# Patient Record
Sex: FEMALE | Race: WHITE
[De-identification: names, ages, dates, MRNs, and addresses within clinical notes are randomized per-mention and may not be internally consistent; named-entity substitution may affect disease eponyms.]

---

## 2017-04-26 ENCOUNTER — HOSPITAL ENCOUNTER (EMERGENCY)
Dept: HOSPITAL 10 - E/R | Age: 42
Discharge: HOME | End: 2017-04-26
Payer: MEDICAID

## 2017-04-26 VITALS
HEIGHT: 64 IN | WEIGHT: 221.56 LBS | HEIGHT: 64 IN | WEIGHT: 221.56 LBS | BODY MASS INDEX: 37.83 KG/M2 | BODY MASS INDEX: 37.83 KG/M2

## 2017-04-26 DIAGNOSIS — Z79.82: ICD-10-CM

## 2017-04-26 DIAGNOSIS — J20.9: Primary | ICD-10-CM

## 2017-04-26 PROCEDURE — 99283 EMERGENCY DEPT VISIT LOW MDM: CPT

## 2017-04-26 NOTE — ERD
ER Documentation


Chief Complaint


Date/Time


DATE: 4/26/17 


TIME: 19:41


Chief Complaint


cough x 4 days, chest congestion





HPI


42-year-old female presents to emergency department for complaints of cough, 

runny nose, nasal congestion, on and off wheezing for 4 days. Patient has been 

having dry cough, does not cough up any phlegm or blood. Patient does not have 

any sore throat or ear pain. Patient denies any chest pain or palpitations. 

Patient did not take any medications to symptoms. Patient denies any fever or 

chills.





ROS


All systems reviewed and are negative except as per history of present illness.





Medications


Home Meds


Active Scripts


Albuterol Sulfate* (Proair HFA*) 8.5 Gm Hfa.aer.ad, 2 PUFF INH Q4H Y for 

WHEEZING AND SOB, #1 INHALER


   Prov:CAMELIA WAHL NP         4/26/17


Fluticasone Propionate (Flonase Allergy Relief) 9.9 Ml [place].susp, 1 SPRAY 

NASAL BID, #1 BOTTLE


   TO EACH NOSTRIL


   Prov:CAMELIA WAHL NP         4/26/17


Ibuprofen* (Motrin*) 400 Mg Tab, 400 MG PO Q6H Y for PAIN AND OR ELEVATED TEMP, 

#30 TAB


   Prov:CAMELIA WAHL NP         4/26/17


Cetirizine Hcl* (Zyrtec*) 10 Mg Capsule, 10 MG PO DAILY, #30 TAB.CHEW


   Prov:CAMELIA WAHL NP         4/26/17


Guaifenesin-D-Methorphan Hb* (Guaifenesin* DM Syrup) 120 Ml Syrup, 10 ML PO Q4H 

Y for COUGH, #120 ML


   Prov:CAMELIA WAHL NP         4/26/17


Ondansetron Hcl* (Zofran*) 4 Mg Tab, 4 MG PO Q4H Y for NAUSEA AND OR VOMITING, #

20 TAB


   Prov:LUNA GUTIÉRREZ PA-C         2/26/16


Aspirin-Acetaminophen-Caffeine* (Excedrin Extra Strength*) 250-250-65 Mg Tablet

, 1 TAB PO Q6H Y for PAIN, #30 TAB


   Prov:LUNA GUTIÉRREZ PA-C         2/26/16





Allergies


Allergies:  


Coded Allergies:  


     No Known Allergy (Unverified , 4/26/17)





PMhx/Soc


History of Surgery:  Yes (csect x3, cataracts bilateral, fibroids)


Hx Alcohol Use:  No


Hx Substance Use:  No


Hx Tobacco Use:  No





FmHx


Family History:  No coronary disease, No diabetes, No other





Physical Exam


Vitals





Vital Signs








  Date Time  Temp Pulse Resp B/P Pulse Ox O2 Delivery O2 Flow Rate FiO2


 


4/26/17 19:27 97.4 83 20 120/64 99   








Physical Exam


GENERAL:  The patient is well developed and appropriate for usual state of 

health, in no apparent distress.


HEENT: Atraumatic. Ears: Normal tympanic membrane, no erythema or bulging. No 

ear canal swelling. No ear discharge. Nose: Erythematous nasal turbinates with 

clear nasal discharge. Throat: oropharynx erythematous with postnasal drip. No 

tonsillar swelling or tonsillar exudates. No lymphadenopathy.


CHEST:  Clear to auscultation bilaterally. There are no rales, wheezes or 

rhonchi. 


HEART:  Regular rate and rhythm. No murmurs, clicks, rubs or gallops. No S3 or 

S4.


ABDOMEN:  Soft, nontender and nondistended. Good bowel sounds. No rebound or 

guarding. No gross peritonitis. No gross organomegaly or masses. No Lal sign 

or McBurney point tenderness.


BACK:  No midline or flank tenderness.


EXTREMITIES:  Equal pulses bilaterally. There is no peripheral clubbing, 

cyanosis or edema. No focal swelling or erythema. Full range of motion. Grossly 

neurovascularly intact.


NEURO:  Alert and oriented. Cranial nerves 2-12 intact. Motor strength in all 4 

extremities with 5/5 strength.  Sensation grossly intact. Normal speech and 

gait. 


SKIN:  There is no apparent rash or petechia. The skin is warm and dry.


HEMATOLOGIC AND LYMPHATIC:  There is no evidence of excessive bruising or 

lymphedema. No gross cervical, axillary, or inguinal lymphadenopathy.





Procedures/MDM


Medical Decision Making:  Patient symptoms are most likely consistent with 

acute bronchitis, which viral in origin.  There is low suspicion for Pneumonia 

at this time since patients lungs sounds are clear, patient O2 saturation is 

normal and patient doesnt show any respiratory distress. Radiology exam is not 

indicated at this time. There is low suspicion for other cardiopulmonary 

emergencies at this time such as CHF, Pulmonary Embolism, Pneumothorax, or any 

other cardiopulmonary emergencies at this time. There is low suspicion for 

sepsis. Patient appears well and is hemodynamically stable. Patient does not 

have any fever


. 


Disposition:  Home.  Condition: Stable


Prescriptions: Guaifenesin DM Zyrtec ibuprofen Flonase albuterol


Instructions: Patient is advised to take medications as prescribed. Patient is 

advised to rest. Patient advised to increase fluid intake, do humidifier at 

home and if possible, do salt water gargles. Patient is advised that if 

symptoms are worse, shortness of breath, uncontrolled fever, stridor, vomiting, 

worst signs and symptoms to return to emergency department immediately. 

Otherwise, patient is advised to follow up with primary doctor in 5-7 days.





Departure


Diagnosis:  


 Primary Impression:  


 Acute bronchitis


 Bronchitis organism:  unspecified organism  Qualified Code:  J20.9 - Acute 

bronchitis, unspecified organism


Condition:  Stable


Patient Instructions:  Bronchitis With Wheezing (Adult)











CAMELIA WAHL NP Apr 26, 2017 19:43

## 2017-10-14 ENCOUNTER — HOSPITAL ENCOUNTER (EMERGENCY)
Dept: HOSPITAL 10 - FTE | Age: 42
Discharge: HOME | End: 2017-10-14
Payer: COMMERCIAL

## 2017-10-14 VITALS
BODY MASS INDEX: 32.6 KG/M2 | WEIGHT: 202.83 LBS | BODY MASS INDEX: 32.6 KG/M2 | WEIGHT: 202.83 LBS | HEIGHT: 66 IN | HEIGHT: 66 IN

## 2017-10-14 VITALS — DIASTOLIC BLOOD PRESSURE: 76 MMHG | SYSTOLIC BLOOD PRESSURE: 140 MMHG | RESPIRATION RATE: 20 BRPM

## 2017-10-14 DIAGNOSIS — J32.9: ICD-10-CM

## 2017-10-14 DIAGNOSIS — H66.93: Primary | ICD-10-CM

## 2017-10-14 PROCEDURE — 99284 EMERGENCY DEPT VISIT MOD MDM: CPT

## 2017-10-14 NOTE — ERD
ER Documentation


Chief Complaint


Date/Time


DATE: 10/14/17 


TIME: 10:38


Chief Complaint


Complains of Sorethroat x 3 days





HPI


42-year-old female presents emergency department complaining of sore throat for 

3 days, frontal headache.  Also she has productive cough for the last 2 days.





Denies blurred vision, changes in vision, neck pain, difficulty swallowing, 

loss of appetite, stiffness, shoulder pain, chest pain, back pain, abdominal 

pain, nausea, vomiting, confusion, diarrhea, urinary symptoms, loss of bowel 

bladder control, recent travel, recent exposure to any illness, recent 

antibiotic use in the last 3 months, fever, chills.





No known drug allergies.  No past medical history.  No surgeries.  Does not 

take any prescription medication at home.  Social: Works at a company.  Denies 

smoking, use of alcohol use of illegal drugs.





ROS


All systems reviewed and are negative except as per history of present illness.





Medications


Home Meds


Active Scripts


Prednisone* (Prednisone*) 20 Mg Tab, 60 MG PO DAILY for 5 Days, TAB


   Prov:VIVIANAHILDAJESSICA EVANS         10/14/17


Acetaminophen* (Tylophen*) 500 Mg Capsule, 1 CAP PO Q6H Y for PAIN AND OR 

ELEVATED TEMP, #20 CAP


   Prov:WELLINGTONNICOLEHILDAAR NATHAN         10/14/17


Ibuprofen* (Motrin*) 800 Mg Tab, 800 MG PO Q8 Y for PAIN AND OR ELEVATED TEMP, #

30 TAB


   Prov:WELLINGTONNICOLEROSALEE         10/14/17


Albuterol Sulfate* (Proair HFA*) 8.5 Gm Hfa.aer.ad, 2 PUFF INH Q4, #1 INHALER


   Prov:WELLINGTONNICOLEHILDAAR NATHAN         10/14/17


Amoxicillin/Potassium Clav (Amox-Clav 875-125 mg Tablet) 875-125 mg Tab, 1 TAB 

PO BID for 7 Days, #14 TAB


   Prov:WELLINGTONNICOLEHILDAAR NATHAN         10/14/17


Albuterol Sulfate* (Proair HFA*) 8.5 Gm Hfa.aer.ad, 2 PUFF INH Q4H Y for 

WHEEZING AND SOB, #1 INHALER


   Prov:CAMELIA WAHL NP         4/26/17


Fluticasone Propionate (Flonase Allergy Relief) 9.9 Ml [place].susp, 1 SPRAY 

NASAL BID, #1 BOTTLE


   TO EACH NOSTRIL


   Prov:CAMELIA WAHL NP         4/26/17


Ibuprofen* (Motrin*) 400 Mg Tab, 400 MG PO Q6H Y for PAIN AND OR ELEVATED TEMP, 

#30 TAB


   Prov:CAMELIA WAHL NP         4/26/17


Cetirizine Hcl* (Zyrtec*) 10 Mg Capsule, 10 MG PO DAILY, #30 TAB.CHEW


   Prov:CAMELIA WAHL NP         4/26/17


Guaifenesin-D-Methorphan Hb* (Guaifenesin* DM Syrup) 120 Ml Syrup, 10 ML PO Q4H 

Y for COUGH, #120 ML


   Prov:CAMELIA WAHL NP         4/26/17


Ondansetron Hcl* (Zofran*) 4 Mg Tab, 4 MG PO Q4H Y for NAUSEA AND OR VOMITING, #

20 TAB


   Prov:LUNA GUTIÉRREZ PA-C         2/26/16


Aspirin-Acetaminophen-Caffeine* (Excedrin Extra Strength*) 250-250-65 Mg Tablet

, 1 TAB PO Q6H Y for PAIN, #30 TAB


   Prov:LUNA GUTIÉRREZ PA-C         2/26/16





Allergies


Allergies:  


Coded Allergies:  


     No Known Allergy (Unverified , 4/26/17)





PMhx/Soc


History of Surgery:  Yes (csect x3, cataracts bilateral, fibroids)


Hx Alcohol Use:  No


Hx Substance Use:  No


Hx Tobacco Use:  No





Physical Exam


Vitals





Vital Signs








  Date Time  Temp Pulse Resp B/P Pulse Ox O2 Delivery O2 Flow Rate FiO2


 


10/14/17 10:29 98.2 79 20 140/76 95   








Physical Exam


Const:  []


Head:   Atraumatic 


Eyes:    Normal Conjunctiva


ENT:    Normal External Ears, Nose and Mouth. Left ear: TM is erythematous.  No 

bleeding.  No discharge.  Right ear: TM is erythematous.  No bleeding.  No 

discharge.  No hearing loss bilaterally.  Frontal and maxillary sinuses 

tenderness to palpation.  Throat: Uvula is in midline not displaced.  Tonsils 

are +1 bilaterally with redness but without exudates.  Tolerating secretions.  

Patent airway.  Speaks full and clear sentences. 


Neck:               Full range of motion..~ No meningismus. No signs of 

meningeal irritation.


Resp:    Clear to auscultation bilaterally


Cardio:    Regular rate and rhythm, no murmurs


Abd:    Soft, non tender, non distended. Normal bowel sounds


Skin:    No petechiae or rashes


Back:    No midline or flank tenderness


Ext:    No cyanosis, or edema


Neur:    Awake and alert


Psych:    Normal Mood and Affect





Procedures/MDM


42-year-old female presents emergency department complaining of sore throat for 

3 days, frontal headache.  Also she has productive cough for the last 2 days.





Denies blurred vision, changes in vision, neck pain, difficulty swallowing, 

loss of appetite, stiffness, shoulder pain, chest pain, back pain, abdominal 

pain, nausea, vomiting, confusion, diarrhea, urinary symptoms, loss of bowel 

bladder control, recent travel, recent exposure to any illness, recent 

antibiotic use in the last 3 months, fever, chills.





No known drug allergies.  No past medical history.  No surgeries.  Does not 

take any prescription medication at home.  Social: Works at a company.  Denies 

smoking, use of alcohol use of illegal drugs.





Physical exam: Left ear: TM is erythematous.  No bleeding.  No discharge.  

Right ear: TM is erythematous.  No bleeding.  No discharge.  No hearing loss 

bilaterally.  Frontal and maxillary sinuses tenderness to palpation.  Throat: 

Uvula is in midline not displaced.  Tonsils are +1 bilaterally with redness but 

without exudates.  Tolerating secretions.  Patent airway.  Speaks full and 

clear sentences.  Respirations even and unlabored lung sounds are clear to 

auscultation. No signs of meningeal irritation. No neurological deficits. No 

neurovascular deficits. 





Disease process was explained to the patient.  She verbalized understanding and 

agreed with the treatment, plan of care, follow-up care.





Differential diagnosis: peritonsillar abscess versus strep throat versus 

sinusitis versus otitis media





Final diagnosis: Bilateral otitis media, sinusitis.





Prescription: Augmentin.  Motrin.  Tylenol.  Pro-air.





Follow-up with primary care physician the next 24-48 hours.  Come back here in 

the emergency department for any new symptoms or any worsening of symptoms.  

All questions and concerns were answered.  Patient verbalized understanding and 

agreed with the plan of care.





Hemodynamically stable on discharge.





Departure


Diagnosis:  


 Primary Impression:  


 Otitis media


 Additional Impressions:  


 Sinusitis


 Sore throat


Condition:  Stable





Additional Instructions:  


Follow-up with primary care physician the next 24-48 hours.  Come back here in 

the emergency department for any new symptoms or any worsening of symptoms.  

All questions and concerns were answered.  Patient verbalized understanding and 

agreed with the plan of care.











ROSALEE MICHELLE Oct 14, 2017 10:42

## 2018-01-13 ENCOUNTER — HOSPITAL ENCOUNTER (EMERGENCY)
Dept: HOSPITAL 91 - E/R | Age: 43
LOS: 1 days | Discharge: HOME | End: 2018-01-14
Payer: MEDICAID

## 2018-01-13 ENCOUNTER — HOSPITAL ENCOUNTER (EMERGENCY)
Age: 43
LOS: 1 days | Discharge: HOME | End: 2018-01-14

## 2018-01-13 DIAGNOSIS — R06.02: ICD-10-CM

## 2018-01-13 DIAGNOSIS — R60.0: Primary | ICD-10-CM

## 2018-01-13 DIAGNOSIS — Z87.891: ICD-10-CM

## 2018-01-13 DIAGNOSIS — Z79.82: ICD-10-CM

## 2018-01-13 PROCEDURE — 96374 THER/PROPH/DIAG INJ IV PUSH: CPT

## 2018-01-13 PROCEDURE — 99284 EMERGENCY DEPT VISIT MOD MDM: CPT

## 2018-01-13 PROCEDURE — 96375 TX/PRO/DX INJ NEW DRUG ADDON: CPT

## 2018-01-13 RX ADMIN — FAMOTIDINE 1 MG: 10 INJECTION, SOLUTION INTRAVENOUS at 22:44

## 2018-01-13 RX ADMIN — DIPHENHYDRAMINE HYDROCHLORIDE 1 MG: 50 INJECTION, SOLUTION INTRAMUSCULAR; INTRAVENOUS at 22:44

## 2018-01-13 RX ADMIN — METHYLPREDNISOLONE SODIUM SUCCINATE 1 MG: 125 INJECTION, POWDER, FOR SOLUTION INTRAMUSCULAR; INTRAVENOUS at 22:44

## 2018-11-26 ENCOUNTER — HOSPITAL ENCOUNTER (EMERGENCY)
Dept: HOSPITAL 91 - FTE | Age: 43
Discharge: HOME | End: 2018-11-26
Payer: MEDICAID

## 2018-11-26 ENCOUNTER — HOSPITAL ENCOUNTER (EMERGENCY)
Age: 43
Discharge: HOME | End: 2018-11-26

## 2018-11-26 DIAGNOSIS — J04.0: Primary | ICD-10-CM

## 2018-11-26 DIAGNOSIS — Z79.82: ICD-10-CM

## 2018-11-26 PROCEDURE — 96372 THER/PROPH/DIAG INJ SC/IM: CPT

## 2018-11-26 PROCEDURE — 99284 EMERGENCY DEPT VISIT MOD MDM: CPT

## 2018-11-26 PROCEDURE — 81025 URINE PREGNANCY TEST: CPT

## 2018-11-26 RX ADMIN — KETOROLAC TROMETHAMINE 1 MG: 30 INJECTION, SOLUTION INTRAMUSCULAR at 21:33

## 2018-11-26 RX ADMIN — DEXAMETHASONE SODIUM PHOSPHATE 1 MG: 10 INJECTION, SOLUTION INTRAMUSCULAR; INTRAVENOUS at 21:33

## 2019-07-12 ENCOUNTER — HOSPITAL ENCOUNTER (EMERGENCY)
Dept: HOSPITAL 91 - FTE | Age: 44
Discharge: HOME | End: 2019-07-12
Payer: MEDICAID

## 2019-07-12 ENCOUNTER — HOSPITAL ENCOUNTER (EMERGENCY)
Dept: HOSPITAL 10 - FTE | Age: 44
Discharge: HOME | End: 2019-07-12
Payer: MEDICAID

## 2019-07-12 VITALS
HEIGHT: 61 IN | WEIGHT: 220.46 LBS | BODY MASS INDEX: 41.62 KG/M2 | HEIGHT: 61 IN | WEIGHT: 220.46 LBS | BODY MASS INDEX: 41.62 KG/M2

## 2019-07-12 VITALS — SYSTOLIC BLOOD PRESSURE: 137 MMHG | HEART RATE: 74 BPM | DIASTOLIC BLOOD PRESSURE: 67 MMHG | RESPIRATION RATE: 18 BRPM

## 2019-07-12 DIAGNOSIS — N39.0: Primary | ICD-10-CM

## 2019-07-12 LAB
ADD MAN DIFF?: NO
ADD UMIC: YES
ALANINE AMINOTRANSFERASE: 24 IU/L (ref 13–69)
ALBUMIN/GLOBULIN RATIO: 1.09
ALBUMIN: 4.7 G/DL (ref 3.3–4.9)
ALKALINE PHOSPHATASE: 145 IU/L (ref 42–121)
ANION GAP: 12 (ref 5–13)
ASPARTATE AMINO TRANSFERASE: 34 IU/L (ref 15–46)
BASOPHIL #: 0 10^3/UL (ref 0–0.1)
BASOPHILS %: 0.4 % (ref 0–2)
BILIRUBIN,DIRECT: 0 MG/DL (ref 0–0.2)
BILIRUBIN,TOTAL: 0.7 MG/DL (ref 0.2–1.3)
BLOOD UREA NITROGEN: 13 MG/DL (ref 7–20)
CALCIUM: 9.4 MG/DL (ref 8.4–10.2)
CARBON DIOXIDE: 26 MMOL/L (ref 21–31)
CHLORIDE: 108 MMOL/L (ref 97–110)
CREATININE: 0.66 MG/DL (ref 0.44–1)
EOSINOPHILS #: 0.2 10^3/UL (ref 0–0.5)
EOSINOPHILS %: 1.6 % (ref 0–7)
GLOBULIN: 4.3 G/DL (ref 1.3–3.2)
GLUCOSE: 121 MG/DL (ref 70–220)
HEMATOCRIT: 43.8 % (ref 37–47)
HEMOGLOBIN: 14.3 G/DL (ref 12–16)
IMMATURE GRANS #M: 0.05 10^3/UL (ref 0–0.03)
IMMATURE GRANS % (M): 0.5 % (ref 0–0.43)
LYMPHOCYTES #: 1.9 10^3/UL (ref 0.8–2.9)
LYMPHOCYTES %: 17.5 % (ref 15–51)
MEAN CORPUSCULAR HEMOGLOBIN: 30 PG (ref 29–33)
MEAN CORPUSCULAR HGB CONC: 32.6 G/DL (ref 32–37)
MEAN CORPUSCULAR VOLUME: 91.8 FL (ref 82–101)
MEAN PLATELET VOLUME: 10.2 FL (ref 7.4–10.4)
MONOCYTE #: 0.5 10^3/UL (ref 0.3–0.9)
MONOCYTES %: 4.3 % (ref 0–11)
NEUTROPHIL #: 8.3 10^3/UL (ref 1.6–7.5)
NEUTROPHILS %: 75.7 % (ref 39–77)
NUCLEATED RED BLOOD CELLS #: 0 10^3/UL (ref 0–0)
NUCLEATED RED BLOOD CELLS%: 0 /100WBC (ref 0–0)
PLATELET COUNT: 308 10^3/UL (ref 140–415)
POTASSIUM: 4 MMOL/L (ref 3.5–5.1)
RED BLOOD COUNT: 4.77 10^6/UL (ref 4.2–5.4)
RED CELL DISTRIBUTION WIDTH: 13.1 % (ref 11.5–14.5)
SODIUM: 146 MMOL/L (ref 135–144)
TOTAL PROTEIN: 9 G/DL (ref 6.1–8.1)
UR ASCORBIC ACID: 40 MG/DL
UR BACTERIA: (no result) /HPF
UR BILIRUBIN (DIP): NEGATIVE MG/DL
UR BLOOD (DIP): (no result) MG/DL
UR CLARITY: (no result)
UR COLOR: YELLOW
UR GLUCOSE (DIP): NEGATIVE MG/DL
UR KETONES (DIP): NEGATIVE MG/DL
UR LEUKOCYTE ESTERASE (DIP): (no result) LEU/UL
UR NITRITE (DIP): NEGATIVE MG/DL
UR NONSQUAMOUS EPITHELIAL CELL: 2 /HPF
UR PH (DIP): 5 (ref 5–9)
UR RBC: > 182 /HPF (ref 0–5)
UR SPECIFIC GRAVITY (DIP): 1.02 (ref 1–1.03)
UR SQUAMOUS EPITHELIAL CELL: (no result) /HPF
UR TOTAL PROTEIN (DIP): NEGATIVE MG/DL
UR UROBILINOGEN (DIP): NEGATIVE MG/DL
UR WBC: > 182 /HPF (ref 0–5)
WHITE BLOOD COUNT: 10.9 10^3/UL (ref 4.8–10.8)

## 2019-07-12 PROCEDURE — 80053 COMPREHEN METABOLIC PANEL: CPT

## 2019-07-12 PROCEDURE — 81025 URINE PREGNANCY TEST: CPT

## 2019-07-12 PROCEDURE — 81001 URINALYSIS AUTO W/SCOPE: CPT

## 2019-07-12 PROCEDURE — 85025 COMPLETE CBC W/AUTO DIFF WBC: CPT

## 2019-07-12 PROCEDURE — 99283 EMERGENCY DEPT VISIT LOW MDM: CPT

## 2019-07-12 RX ADMIN — PHENAZOPYRIDINE HYDROCHLORIDE 1 MG: 100 TABLET ORAL at 09:15

## 2019-07-12 NOTE — ERD
ER Documentation


Chief Complaint


Chief Complaint





pt is bib family with c/o painful urination for a few days





HPI


Patient is a 44 years old female with no known past medical history presenting 


to the clinic with dysuria, urinary urgency, urinary frequency, fever, chills, 


suprapubic pain X 2 days.  Patient denies take any OTC medication stating that 


her pain has worsened as of now.  Patient denies vaginal discharge or bleeding.





ROS


All systems reviewed and are negative except as per history of present illness.





Allergies


Allergies:  


Coded Allergies:  


     No Known Allergy (Unverified , 2/21/14)





PMhx/Soc


History of Surgery:  Yes (c/s,appendectomy)


Anesthesia Reaction:  No


Hx Neurological Disorder:  No


Hx Respiratory Disorders:  No


Hx Cardiac Disorders:  No


Hx Psychiatric Problems:  No


Hx Miscellaneous Medical Probl:  Yes (migraine ha)


Hx Alcohol Use:  No


Hx Substance Use:  No


Hx Tobacco Use:  Yes


Smoking Status:  Never smoker





FmHx


Family History:  No diabetes, No coronary disease, No other





Physical Exam


Vitals





Vital Signs


  Date      Temp  Pulse  Resp  B/P (MAP)   Pulse Ox  O2          O2 Flow    FiO2


Time                                                 Delivery    Rate


   7/12/19  98.3     74    18      137/67        98


     08:26                           (90)





Physical Exam


Const:   No acute distress


Head:   Atraumatic 


Eyes:    Normal Conjunctiva


Resp:   Clear to auscultation bilaterally


Cardio:   Regular rate and rhythm, no murmurs


Abd:    Soft, non tender, non distended. Normal bowel sounds.  Negative Lal 


sign, Rovsing sign, McBurney's point tenderness, guarding, rebound tenderness, 


[place] sign, Marino Mitchell sign.


Skin:   No petechiae or rashes


Back:   No midline or flank tenderness.  Left CVAT.


Neur:   Awake and alert


Psych:    Normal Mood and Affect


Result Diagram:  


7/12/19 0903 7/12/19 0903





Results 24 hrs





Laboratory Tests


Test
                              7/12/19
09:03    7/12/19
09:05  7/12/19
09:11


White Blood Count                  10.9 10^3/ul


Red Blood Count                    4.77 10^6/ul


Hemoglobin                            14.3 g/dl


Hematocrit                               43.8 %


Mean Corpuscular Volume                 91.8 fl


Mean Corpuscular Hemoglobin             30.0 pg


Mean Corpuscular                     32.6 g/dl 
  
                



Hemoglobin
Concent


Red Cell Distribution Width              13.1 %


Platelet Count                      308 10^3/UL


Mean Platelet Volume                    10.2 fl


Immature Granulocytes %                 0.500 %


Neutrophils %                            75.7 %


Lymphocytes %                            17.5 %


Monocytes %                               4.3 %


Eosinophils %                             1.6 %


Basophils %                               0.4 %


Nucleated Red Blood Cells %         0.0 /100WBC


Immature Granulocytes #           0.050 10^3/ul


Neutrophils #                       8.3 10^3/ul


Lymphocytes #                       1.9 10^3/ul


Monocytes #                         0.5 10^3/ul


Eosinophils #                       0.2 10^3/ul


Basophils #                         0.0 10^3/ul


Nucleated Red Blood Cells #         0.0 10^3/ul


Sodium Level                         146 mmol/L


Potassium Level                      4.0 mmol/L


Chloride Level                       108 mmol/L


Carbon Dioxide Level                  26 mmol/L


Anion Gap                                    12


Blood Urea Nitrogen                    13 mg/dl


Creatinine                           0.66 mg/dl


Est Glomerular Filtrat            > 60 mL/min 
   
                



Rate
mL/min


Glucose Level                         121 mg/dl


Calcium Level                         9.4 mg/dl


Total Bilirubin                       0.7 mg/dl


Direct Bilirubin                     0.00 mg/dl


Indirect Bilirubin                    0.7 mg/dl


Aspartate Amino                        34 IU/L 
  
                



Transf
(AST/SGOT)


Alanine                                24 IU/L 
  
                



Aminotransferase
(ALT/SGPT)


Alkaline Phosphatase                   145 IU/L


Total Protein                          9.0 g/dl


Albumin                                4.7 g/dl


Globulin                              4.30 g/dl


Albumin/Globulin Ratio                     1.09


Urine Color                                       YELLOW


Urine Clarity                                     CLOUDY


Urine pH                                                     5.0


Urine Specific Gravity                                     1.019


Urine Ketones                                     NEGATIVE mg/dL


Urine Nitrite                                     NEGATIVE mg/dL


Urine Bilirubin                                   NEGATIVE mg/dL


Urine Urobilinogen                                NEGATIVE mg/dL


Urine Leukocyte Esterase                               3+ Terrance/ul


Urine Microscopic RBC                             > 182 /HPF


Urine Microscopic WBC                             > 182 /HPF


Urine Squamous Epithelial
Cells   
               FEW /HPF 
       



Urine Bacteria                                    FEW /HPF


Urine Hemoglobin                                        2+ mg/dL


Urine Glucose                                     NEGATIVE mg/dL


Urine Total Protein                               NEGATIVE mg/dl


POC Beta HCG, Qualitative                                          NEGATIVE





Current Medications


 Medications
   Dose
          Sig/Cassie
       Start Time
   Status  Last


 (Trade)       Ordered        Route
 PRN     Stop Time              Admin
Dose


                              Reason                                Admin


                200 mg         ONCE  ONCE
    7/12/19       DC           7/12/19


Phenazopyridi                 PO
            09:00
                       09:15



ne
 HCl
                                     7/12/19 09:01


(Pyridium)








Procedures/MDM


Patient was seen and evaluated for dysuria without complications.  CBC, 


urinalysis revealed mild leukocytosis, leukocyte esterase, urine WBC.  Patient 


is experiencing UTI. patient was given Pyridium 200 mg in ED.





Patient stable ready for discharge.  Follow-up with PCP.  Patient will be 


discharged with Pyridium and Macrobid.





Departure


Diagnosis:  


   Primary Impression:  


   Dysuria


   Additional Impression:  


   UTI (urinary tract infection)


   Urinary tract infection type:  site unspecified  Hematuria presence:  without


   hematuria  Qualified Codes:  N39.0 - Urinary tract infection, site not 


   specified


Condition:  Stable


Patient Instructions:  Dysuria, Understanding Urinary Tract Infections (UTIs)


Referrals:  


[facility]





Additional Instructions:  


Paciente aconseja volver a Departamento de urgencias inmediatamente para s


ntomas nuevos o que empeoran . Paciente aconseja posteriores con el PCP en 2-3 


clarke . Paciente verbaliza la comprehensin y est de acuerdo con el tratamiento 


y el curso de accin.














Si el paciente no tiene ninguna de atencin primaria pueden seguir con





























[facility]





[address] [place] [address]














o














[facility] + [address] [address]











WERO LAL PA-C               Jul 12, 2019 09:04